# Patient Record
Sex: FEMALE | Race: WHITE
[De-identification: names, ages, dates, MRNs, and addresses within clinical notes are randomized per-mention and may not be internally consistent; named-entity substitution may affect disease eponyms.]

---

## 2018-04-13 ENCOUNTER — HOSPITAL ENCOUNTER (EMERGENCY)
Dept: HOSPITAL 80 - CED | Age: 23
Discharge: HOME | End: 2018-04-13
Payer: COMMERCIAL

## 2018-04-13 VITALS — DIASTOLIC BLOOD PRESSURE: 59 MMHG | SYSTOLIC BLOOD PRESSURE: 94 MMHG

## 2018-04-13 DIAGNOSIS — R19.7: Primary | ICD-10-CM

## 2018-04-13 DIAGNOSIS — R10.84: ICD-10-CM

## 2018-04-13 LAB — PLATELET # BLD: 232 10^3/UL (ref 150–400)

## 2018-04-13 NOTE — EDPHY
H & P


Time Seen by Provider: 04/13/18 20:02


HPI/ROS: 





CHIEF COMPLAINT:  Abdominal pain





HISTORY OF PRESENT ILLNESS:  This is a healthy 22-year-old [employer] student 

who returned from [facility] 2 weeks ago.  Since returning she has 

had persistent abdominal cramping along with diarrhea.  She was having loose 

watery stools 5-6 times daily but today had only 2 stools that were loose, not 

liquid.  She has not had bloody diarrhea.  She has been evaluated twice at 

[facility].  She provided a stool sample this week and 

was told that she does not have parasites.  She is not sure what tests were run 

on the stool sample.  She took a Renew [employer] parasite treatment that consisted 

of a pill and a tincture that was mixed with liquid.  She took this for 5 or 6 

days but stopped it because she was having no improvement in her symptoms.  The 

only other treatments she has tried  are Rolaids and Midol, both of which she 

began today.  She notes that the pain is worse at night.  She describes it as a 

cramping pain primarily in the mid abdomen but with some extension into the 

left upper abdomen and the lower abdomen.  She also notes aching across her 

lower and mid back that has been present for the last 4-5 days.  Occasionally 

there is a sharper component to the discomfort.  She has had some chills.  

Earlier in the week she had nausea but no vomiting.  She denies dysuria, 

urgency or frequency.





Last menstrual period was March 20th.  She is not sexually active.  She does 

not use birth control.





REVIEW OF SYSTEMS:





A ten point review of systems was performed and is negative with the exception 

of the items mentioned in the HPI.





Past medical history:  Negative





Past surgical history:  Negative





Social history:  She is a student at the [employer] in her final 

year.  She is studying sociology.  She eats of [place] diet.  She does not use 

tobacco products.





General Appearance:  Alert.  Vital signs reviewed.  


Eyes:  Pupils equal and round, no conjunctival injection, no discharge. 

Anicteric.


ENT, Mouth:  Mucous membranes are moist, no oropharyngeal erythema or edema.


Neck:  No lymphadenopathy, supple.


Respiratory:  Lungs are clear to auscultation; no wheezes, rales, or rhonchi.


Cardiovascular:  Regular rate and rhythm; no murmur, rub, or gallop.


Gastrointestinal:  Abdomen is soft and mildly tender in the periumbilical region

, no guarding, no masses or organomegaly, bowel sounds normal.


Skin:  Warm and dry, no rashes on exposed skin, normal color.


Back:  Nontender to palpation over the thoracolumbar spine. No CVAT.


Extremities:  No lower extremity edema, no calf tenderness or swelling.


Neurological:  Alert and oriented.  Moving all four extremities easily and 

equally.


Psychiatric:  Normal affect.


Constitutional: 


 Initial Vital Signs











Temperature (C)  36.6 C   04/13/18 20:00


 


Heart Rate  61   04/13/18 20:00


 


Respiratory Rate  16   04/13/18 20:00


 


Blood Pressure  112/72   04/13/18 20:00


 


O2 Sat (%)  96   04/13/18 20:00








 











O2 Delivery Mode               Room Air














Allergies/Adverse Reactions: 


 





No Known Allergies Allergy (Unverified 04/13/18 20:04)


 








Home Medications: 














 Medication  Instructions  Recorded


 


Dicyclomine [Bentyl 20 MG (*)] 20 mg PO QID #20 tab 04/13/18














Medical Decision Making


ED Course/Re-evaluation: 





2 weeks of intermittent diarrhea and crampy abdominal pain since returning from 

[place].  She has been undergoing an evaluation at the [facility][note 3] but 

comes tonight because of persistent crampy abdominal pain that is severe enough 

to bring her to tears.  She has not taken any medication for this other than 

Midol and Rolaids.





She had a stool sample done earlier this week that was reportedly negative, 

although she is not certain what tests were run.  Blood was drawn today but she 

does not know the results.  She was referred to Gastroenterology today.





In the emergency department she is given a Bentyl.  I am repeating some of the 

blood work.





On reexamination at approximately 9:00 p.m. She is sitting up and states that 

she feels somewhat better.  Abdomen is soft with some mild diffuse abdominal 

tenderness, no peritoneal signs.  I do not suspect a surgical abdomen or bowel 

obstruction.  She has not had diarrheal stool in the emergency department.  I 

reviewed her labs.  White blood cell count is elevated at over 12,000. 

Urinalysis is negative.  She is not pregnant.  Chemistries are normal.  She is 

not febrile.  She does not appear dehydrated/hypovolemic.  I am recommending 

some symptomatic treatments including Bentyl, Tylenol and/or ibuprofen, and 

Vicodin to use on an as-needed basis at night when the pain seems to be worse.  

She is reluctant to use the Vicodin but agreed to take a prepack.  I reviewed 

the danger signs that should prompt her to be re-evaluated.  She is going to 

follow up at [facility] and also with Gastroenterology.  She has been given a 

Gastroenterology referral by the [facility][note 3].


Differential Diagnosis: 





Abdominal pain including but not limited to traveler's or other diarrhea (viral

, bacterial, or parasitic), inflammatory bowel disease, irritable bowel, 

ovarian cyst, ovarian torsion, pregnancy, appendicitis,  and urinary tract 

infection.





- Data Points


Laboratory Results: 


 Laboratory Results





 04/13/18 20:30 





 04/13/18 20:30 





 











  04/13/18 04/13/18 04/13/18





  20:30 20:30 20:30


 


WBC      





    


 


RBC      





    


 


Hgb      





    


 


Hct      





    


 


MCV      





    


 


MCH      





    


 


MCHC      





    


 


RDW      





    


 


Plt Count      





    


 


MPV      





    


 


Neut % (Auto)      





    


 


Lymph % (Auto)      





    


 


Mono % (Auto)      





    


 


Eos % (Auto)      





    


 


Baso % (Auto)      





    


 


Nucleat RBC Rel Count      





    


 


Absolute Neuts (auto)      





    


 


Absolute Lymphs (auto)      





    


 


Absolute Monos (auto)      





    


 


Absolute Eos (auto)      





    


 


Absolute Basos (auto)      





    


 


Absolute Nucleated RBC      





    


 


Immature Gran %      





    


 


Seg Neutrophils %      





    


 


Band Neutrophils %      





    


 


Lymphocytes %      





    


 


Monocytes %      





    


 


Eosinophils %      





    


 


Basophils %      





    


 


Immature Gran #      





    


 


Absolute Seg Neuts      





    


 


Absolute Band Neuts      





    


 


Absolute Lymphocytes      





    


 


Absolute Monocytes      





    


 


Absolute Eosinophils      





    


 


Absolute Basophils      





    


 


RBC/WBC/PLT Morphology      





    


 


Atypical Lymphocytes      





    


 


Platelet Estimate      





    


 


Smear Review By      





    


 


Sodium      141 mEq/L mEq/L





     (135-145) 


 


Potassium      3.9 mEq/L mEq/L





     (3.5-5.2) 


 


Chloride      103 mEq/L mEq/L





     () 


 


Carbon Dioxide      27 mEq/l mEq/l





     (22-31) 


 


Anion Gap      11 mEq/L mEq/L





     (8-16) 


 


BUN      7 mg/dL mg/dL





     (7-23) 


 


Creatinine      0.6 mg/dL mg/dL





     (0.6-1.0) 


 


Estimated GFR      > 60 





    


 


Glucose      97 mg/dL mg/dL





     () 


 


Calcium      9.5 mg/dL mg/dL





     (8.5-10.4) 


 


Beta HCG, Qual    NEGATIVE   





    


 


Urine Color  YELLOW     





    


 


Urine Appearance  CLEAR     





    


 


Urine pH  8.0  H     





   (5.0-7.5)   


 


Ur Specific Gravity  1.010     





   (1.002-1.030)   


 


Urine Protein  NEGATIVE     





   (NEGATIVE)   


 


Urine Ketones  NEGATIVE     





   (NEGATIVE)   


 


Urine Blood  NEGATIVE     





   (NEGATIVE)   


 


Urine Nitrate  NEGATIVE     





   (NEGATIVE)   


 


Urine Bilirubin  NEGATIVE     





   (NEGATIVE)   


 


Urine Urobilinogen  0.2 EU EU    





   (0.2-1.0)   


 


Ur Leukocyte Esterase  NEGATIVE     





   (NEGATIVE)   


 


Urine Glucose  NEGATIVE     





   (NEGATIVE)   














  04/13/18





  20:30


 


WBC  12.34 10^3/uL H 10^3/uL





   (3.80-9.50) 


 


RBC  4.32 10^6/uL 10^6/uL





   (4.18-5.33) 


 


Hgb  13.5 g/dL g/dL





   (12.6-16.3) 


 


Hct  40.3 % %





   (38.0-47.0) 


 


MCV  93.3 fL fL





   (81.5-99.8) 


 


MCH  31.3 pg pg





   (27.9-34.1) 


 


MCHC  33.5 g/dL g/dL





   (32.4-36.7) 


 


RDW  12.5 % %





   (11.5-15.2) 


 


Plt Count  232 10^3/uL 10^3/uL





   (150-400) 


 


MPV  9.4 fL fL





   (8.7-11.7) 


 


Neut % (Auto)  Not Reported 





  


 


Lymph % (Auto)  Not Reported 





  


 


Mono % (Auto)  Not Reported 





  


 


Eos % (Auto)  Not Reported 





  


 


Baso % (Auto)  Not Reported 





  


 


Nucleat RBC Rel Count  0.0 % %





   (0.0-0.2) 


 


Absolute Neuts (auto)  Not Reported 





  


 


Absolute Lymphs (auto)  Not Reported 





  


 


Absolute Monos (auto)  Not Reported 





  


 


Absolute Eos (auto)  Not Reported 





  


 


Absolute Basos (auto)  Not Reported 





  


 


Absolute Nucleated RBC  0.00 10^3/uL 10^3/uL





   (0-0.01) 


 


Immature Gran %  Not Reported 





  


 


Seg Neutrophils %  16 % %





  


 


Band Neutrophils %  1 % %





  


 


Lymphocytes %  26 % %





  


 


Monocytes %  7 % %





  


 


Eosinophils %  49 % %





  


 


Basophils %  1 % %





  


 


Immature Gran #  Not Reported 





  


 


Absolute Seg Neuts  2.0 K/MM3 K/MM3





   (1.8-7) 


 


Absolute Band Neuts  0.1 K/MM3 K/MM3





   (0-0.7) 


 


Absolute Lymphocytes  3.2 K/mm3 K/mm3





   (1.0-4.8) 


 


Absolute Monocytes  0.9 K/mm3 H K/mm3





   (0-0.8) 


 


Absolute Eosinophils  6.0 K/mm3 H K/mm3





   (0-0.5) 


 


Absolute Basophils  0.1 K/mm3 K/mm3





   (0-0.2) 


 


RBC/WBC/PLT Morphology  NORMAL 





   (NORMAL) 


 


Atypical Lymphocytes  1+  H 





  


 


Platelet Estimate  ADEQUATE 





   (ADEQ) 


 


Smear Review By  Pending 





  


 


Sodium  





  


 


Potassium  





  


 


Chloride  





  


 


Carbon Dioxide  





  


 


Anion Gap  





  


 


BUN  





  


 


Creatinine  





  


 


Estimated GFR  





  


 


Glucose  





  


 


Calcium  





  


 


Beta HCG, Qual  





  


 


Urine Color  





  


 


Urine Appearance  





  


 


Urine pH  





  


 


Ur Specific Gravity  





  


 


Urine Protein  





  


 


Urine Ketones  





  


 


Urine Blood  





  


 


Urine Nitrate  





  


 


Urine Bilirubin  





  


 


Urine Urobilinogen  





  


 


Ur Leukocyte Esterase  





  


 


Urine Glucose  





  











Medications Given: 


 








Discontinued Medications





Dicyclomine HCl (Bentyl)  20 mg PO EDNOW ONE


   Stop: 04/13/18 20:23


   Last Admin: 04/13/18 20:29 Dose:  20 mg








Departure





- Departure


Disposition: Home, Routine, Self-Care


Clinical Impression: 


Abdominal pain


Qualifiers:


 Abdominal location: generalized Qualified Code(s): R10.84 - Generalized 

abdominal pain





Diarrhea


Qualifiers:


 Diarrhea type: presumed infectious Qualified Code(s): R19.7 - Diarrhea, 

unspecified





Condition: Good


Instructions:  Loperamide (By mouth), Traveler's Diarrhea (ED), Abdominal Pain (

ED)


Additional Instructions: 


Take the Bentyl 20 mg four times daily for the next couple of days.





Take ibuprofen 400 mg every six hours for pain.  Take this with a bit of food.





You can also take tylenol (acetominophen) 650 mg every four hours for pain or 

for fever/chills.  If you have it shaking chill I recommend taking a dose of 

Tylenol.  If you have severe pain, try the Norco (vicodin).  Each Norco 

contains 325 mg of tylenol.  Keep track of all the tylenol you take and do not 

take more than 3000 mg in a 24 hour time period.





If you have diarrheal (watery liquid) stools take Imodium.





Follow up with gastroenterology as advised.





If you develop persistent fever, worsening or intractable abdominal pain, 

vomiting, frequent diarrhea or bloody diarrhea--you should be re-evaluated.


Referrals: 


[facility],. [Clinic] - As per Instructions


Prescriptions: 


Dicyclomine [Bentyl 20 MG (*)] 20 mg PO QID #20 tab

## 2018-04-20 ENCOUNTER — HOSPITAL ENCOUNTER (EMERGENCY)
Dept: HOSPITAL 80 - FED | Age: 23
Discharge: HOME | End: 2018-04-20
Payer: COMMERCIAL

## 2018-04-20 VITALS — SYSTOLIC BLOOD PRESSURE: 116 MMHG | DIASTOLIC BLOOD PRESSURE: 69 MMHG

## 2018-04-20 DIAGNOSIS — R10.9: Primary | ICD-10-CM

## 2018-04-20 DIAGNOSIS — R19.7: ICD-10-CM

## 2018-04-20 NOTE — EDPHY
H & P


Time Seen by Provider: 04/20/18 18:58


HPI/ROS: 





CHIEF COMPLAINT:  Abdominal pain, diarrhea





HISTORY OF PRESENT ILLNESS:  22-year-old female presents to the emergency 

department with abdominal pain and diarrhea.  Patient was in [place] 3 weeks ago 

and on the last a she developed some cramping abdominal pain and diarrhea.  

This continued and she was seen in the emergency department 1 week ago.  She 

apparently had already had stool testing done at [facility][note 3] which 

was negative.  She was given Bentyl for the cramping which helped and her 

symptoms did improve for a few days but then they came back yesterday.  She had 

3 episodes of watery diarrhea yesterday.  No blood in her stool.  No melena.  

The patient tried following up with Gastroenterology, however she was not able 

to get an appointment until May.  She denies headache.  Denies urinary 

symptoms.  She states that the pain is not that bad right now.  She states that 

when she does have abdominal pain is more in the epigastric area.  She has been 

trying to eat normally.  She tried taking some Rolaids.





REVIEW OF SYSTEMS:


Constitutional:  No fever, no chills.


Eyes:  No double or blurry vision.


ENT:  No sore throat.


Respiratory:  No cough, no shortness of breath.


Cardiac:  No chest pain.


Gastrointestinal:  Abdominal pain, diarrhea.  No vomiting.


Genitourinary:  No dysuria.


Musculoskeletal:  No neck or back pain.


Skin:  No rashes.


Neurological:  No headache.


Past Medical/Surgical History: 





IBS


Social History: 





[employer] student from [place]


Smoking Status: Never smoked


Physical Exam: 





General Appearance:  Alert, no distress.  Afebrile.  Nontoxic appearing.  Vital 

signs are stable.


Eyes:  Pupils equal and round.  Extraocular motions are all intact.


ENT:  Mouth:  Mucous membranes moist.


Respiratory:  No wheezing, rhonchi, or rales, lungs are clear to auscultation.


Cardiovascular:  Regular rate and rhythm.


Gastrointestinal:  Abdomen is soft and nontender, no masses, no rebound or 

guarding, bowel sounds normal.  No CVA tenderness bilaterally.


Neurological:  Alert and oriented x 3, cranial nerves II through XII grossly 

intact


Skin:  Warm and dry, no rashes.


Musculoskeletal:  Nontender to palpate along the cervical, thoracic or lumbar 

spine.  Neck is supple.


Extremities:  Full range of motion and no peripheral edema.


Psychiatric:  Patient is oriented X 3, there is no agitation.


Constitutional: 


 Initial Vital Signs











Temperature (C)  36.7 C   04/20/18 18:50


 


Heart Rate  66   04/20/18 18:50


 


Respiratory Rate  17   04/20/18 18:50


 


Blood Pressure  105/65   04/20/18 18:50


 


O2 Sat (%)  97   04/20/18 18:50








 











O2 Delivery Mode               Room Air














Allergies/Adverse Reactions: 


 





No Known Allergies Allergy (Verified 04/20/18 18:49)


 








Home Medications: 














 Medication  Instructions  Recorded


 


Dicyclomine [Bentyl 20 MG (*)] 20 mg PO QID #20 tab 04/13/18


 


Ciprofloxacin [Cipro 500 mg] 500 mg PO BID 3 Days #6 tab 04/20/18


 


Dicyclomine [Bentyl] 20 mg PO QID #5 tab 04/20/18














Medical Decision Making


ED Course/Re-evaluation: 





Clinically I do not think this patient has an acute abdomen.  I do not think 

imaging studies are indicated.  I do not think blood work needs to be repeated.

  The patient reports foreign travel to [place] now with abdominal cramping and 

continued diarrhea.  The case was discussed with Dr. Coley, secondary 

supervising physician, who did not directly evaluate the patient but agrees 

with treatment and plan.





Patient was unable to provide stool specimen while she is in the emergency 

department.  She was sent with a stool collection kit as well as an order for 

GI pathogen culture.  I also encouraged her to call [facility] to 

schedule appointment to tell them that she has an ER follow-up appointment.





I also treatment of ciprofloxacin 500 mg twice daily for 3 days given her 

foreign travel and continued ongoing diarrhea.  The patient agreed with a 

prescription.  She was given black box warning of tendinopathy.





Patient was also given a refill of her Bentyl.  She was told to return to the 

emergency department if she developed bloody diarrhea, abdominal pain, fever, 

or if she felt worse in any way.


Differential Diagnosis: 





Including but not limited to infectious diarrhea, colitis, irritable bowel 

syndrome, GERD, peptic ulcer disease





- Data Points


Medications Given: 


 








Discontinued Medications





Ciprofloxacin (Cipro)  500 mg PO EDNOW ONE


   PRN Reason: Protocol


   Stop: 04/20/18 20:22


   Last Admin: 04/20/18 20:27 Dose:  500 mg








Departure





- Departure


Disposition: Home, Routine, Self-Care


Clinical Impression: 


Abdominal pain


Qualifiers:


 Abdominal location: unspecified location Qualified Code(s): R10.9 - 

Unspecified abdominal pain





Diarrhea


Qualifiers:


 Diarrhea type: unspecified type Qualified Code(s): R19.7 - Diarrhea, 

unspecified





Condition: Good


Instructions:  Acute Diarrhea (ED), Acute Abdominal Pain (ED)


Additional Instructions: 


Abdominal Pain:


Return to the Emergency Department immediately for increasing pain, fever, 

vomiting, or if not completely better in 8-12 hours.





Cipro 500mg twice daily for 3 days.  You may continue Bentyl as needed for 

abdominal cramping.





Bring stool specimen back asa to the lab. Call [phone] for the results of 

your stool culture in 48 hr.





Call to arrange follow-up appointment with gastroenterologist on-call.  When 

you call to arrange the appointment, tell them that you were seen in the 

emergency department twice and told to be seen for follow-up.


Referrals: 


Anam Bryant MD [Medical Doctor] - As per Instructions (Gastroenterologist on-

call)


Prescriptions: 


Ciprofloxacin [Cipro 500 mg] 500 mg PO BID 3 Days #6 tab


Dicyclomine [Bentyl] 20 mg PO QID #5 tab